# Patient Record
Sex: FEMALE | Race: OTHER | Employment: UNEMPLOYED | URBAN - METROPOLITAN AREA
[De-identification: names, ages, dates, MRNs, and addresses within clinical notes are randomized per-mention and may not be internally consistent; named-entity substitution may affect disease eponyms.]

---

## 2017-01-16 ENCOUNTER — HOSPITAL ENCOUNTER (OUTPATIENT)
Dept: PHYSICAL THERAPY | Age: 32
Discharge: HOME | End: 2017-01-16
Payer: COMMERCIAL

## 2017-01-16 PROCEDURE — 97110 THERAPEUTIC EXERCISES: CPT

## 2017-01-16 PROCEDURE — 97161 PT EVAL LOW COMPLEX 20 MIN: CPT
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
## 2017-01-27 ENCOUNTER — HOSPITAL ENCOUNTER (OUTPATIENT)
Dept: PHYSICAL THERAPY | Age: 32
Discharge: HOME | End: 2017-01-27
Payer: COMMERCIAL

## 2017-01-27 PROCEDURE — 97110 THERAPEUTIC EXERCISES: CPT

## 2017-01-27 PROCEDURE — 97014 ELECTRIC STIMULATION THERAPY: CPT

## 2017-01-27 NOTE — PROGRESS NOTES
PT DAILY TREATMENT NOTE 8-14    Patient Name: Nanette Quijano  Date:2017  : 1985  [x]  Patient  Verified  Payor: SELF PAY / Plan: [facility] SELF PAY / Product Type: Self Pay /    In time:730  Out time:828  Total Treatment Time (min): 58  Visit #: 2 of 12    Treatment Area: Lower back pain [M54.5]    SUBJECTIVE  Pain Level (0-10 scale): 6  Any medication changes, allergies to medications, adverse drug reactions, diagnosis change, or new procedure performed?: [x] No    [] Yes (see summary sheet for update)  Subjective functional status/changes:   [] No changes reported  \"I tried the exercises 1 time a day and they seemed to help I think\"    OBJECTIVE  Modality rationale: decrease pain and increase tissue extensibility to improve the patients ability to perform functional ADL's   Min Type Additional Details   15 [x] Estim: []Att   [x]Unatt        []TENS instruct                  [x]IFC  []Premod   []NMES                     []Other:  []w/US   []w/ice   [x]w/heat  Position:supine  Location: L/S    []  Traction: [] Cervical       []Lumbar                       [] Prone          []Supine                       []Intermittent   []Continuous Lbs:  [] before manual  [] after manual    []  Ultrasound: []Continuous   [] Pulsed                           []1MHz   []3MHz Location:  W/cm2:    []  Iontophoresis with dexamethasone         Location: [] Take home patch   [] In clinic    []  Ice     []  heat  []  Ice massage Position:  Location:    []  Vasopneumatic Device Pressure:       [] lo [] med [] hi   Temperature: [] lo [] med [] hi   [x] Skin assessment post-treatment:  [x]intact []redness- no adverse reaction       []redness  adverse reaction:       43 min Therapeutic Exercise:  [x] See flow sheet :   Rationale: increase ROM and increase strength to improve the patients ability to perform functional ADL's            X min Patient Education: [x] Review HEP    [] Progressed/Changed HEP based on:   [] positioning   [] body mechanics   [] transfers   [] heat/ice application        Other Objective/Functional Measures: Initiated therex today per flow sheet, requiring vc and demo 100% of the time for proper form and technique. Pain Level (0-10 scale) post treatment: 1    ASSESSMENT/Changes in Function: Pt required VC and demo for all therex today for proper form and technique. Pt demo'd increased tightness on the R QL and R piriformis greater then the L. Will continue to progress therex within current POC as patient is able. Patient will continue to benefit from skilled PT services to modify and progress therapeutic interventions, address functional mobility deficits, address ROM deficits, address strength deficits, analyze and address soft tissue restrictions, analyze and cue movement patterns, analyze and modify body mechanics/ergonomics and assess and modify postural abnormalities to attain remaining goals. [x]  See Plan of Care  []  See progress note/recertification  []  See Discharge Summary         Progress towards goals / Updated goals:  All goals reviewed and progressing appropriately as of 1/27/2017     PLAN  [x]  Upgrade activities as tolerated     [x]  Continue plan of care  []  Update interventions per flow sheet       []  Discharge due to:_  []  Other:_      Kita Rivera, PT 1/27/2017  7:21 AM    Future Appointments  Date Time Provider Blayne Cardozo   1/27/2017 7:30 AM Jeronimo Yun PT [facility]   1/30/2017 5:00 PM Jeronimo Yun PT [facility]   2/3/2017 7:30 AM Jeronimo Yun PT [facility]   2/6/2017 7:30 AM Eleonora Benites PTA [facility]   2/10/2017 7:30 AM Jeronimo Yun PT [facility]

## 2017-01-30 ENCOUNTER — HOSPITAL ENCOUNTER (OUTPATIENT)
Dept: PHYSICAL THERAPY | Age: 32
Discharge: HOME | End: 2017-01-30
Payer: COMMERCIAL

## 2017-01-30 PROCEDURE — 97110 THERAPEUTIC EXERCISES: CPT

## 2017-01-30 PROCEDURE — 97014 ELECTRIC STIMULATION THERAPY: CPT

## 2017-01-30 NOTE — PROGRESS NOTES
PT DAILY TREATMENT NOTE 8-14    Patient Name: Irma Height  Date:2017  : 1985  [x]  Patient  Verified  Payor: SELF PAY / Plan: 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 in the back\"    OBJECTIVE  Modality rationale: decrease pain and increase tissue extensibility to improve the patients ability to perform functional ADL's   Min Type Additional Details   15 [x] Estim: []Att   [x]Unatt        []TENS instruct                  [x]IFC  []Premod   []NMES                     []Other:  []w/US   []w/ice   [x]w/heat  Position:supine  Location: L/S    []  Traction: [] Cervical       []Lumbar                       [] Prone          []Supine                       []Intermittent   []Continuous Lbs:  [] before manual  [] after manual    []  Ultrasound: []Continuous   [] Pulsed                           []1MHz   []3MHz Location:  W/cm2:    []  Iontophoresis with dexamethasone         Location: [] Take home patch   [] In clinic    []  Ice     []  heat  []  Ice massage Position:  Location:    []  Vasopneumatic Device Pressure:       [] lo [] med [] hi   Temperature: [] lo [] med [] hi   [x] Skin assessment post-treatment:  [x]intact []redness- no adverse reaction       []redness  adverse reaction:       48 min Therapeutic Exercise:  [x] See flow sheet :   Rationale: increase ROM and increase strength to improve the patients ability to perform functional ADL's            X min Patient Education: [x] Review HEP    [] Progressed/Changed HEP based on:   [] positioning   [] body mechanics   [] transfers   [] heat/ice application        Other Objective/Functional Measures:       Pain Level (0-10 scale) post treatment: 2    ASSESSMENT/Changes in Function: Pt demonstrated increase ability to perform bird dogs with UE today with no reports of knee pain. Pt able to maintain bridge on the SB with better tolerance today then last visit. Will continue to progress therex within current POC as patient is able. Patient will continue to benefit from skilled PT services to modify and progress therapeutic interventions, address functional mobility deficits, address ROM deficits, address strength deficits, analyze and address soft tissue restrictions, analyze and cue movement patterns, analyze and modify body mechanics/ergonomics and assess and modify postural abnormalities to attain remaining goals. [x]  See Plan of Care  []  See progress note/recertification  []  See Discharge Summary         Progress towards goals / Updated goals:  All goals reviewed and progressing appropriately as of 1/30/2017     PLAN  [x]  Upgrade activities as tolerated     [x]  Continue plan of care  []  Update interventions per flow sheet       []  Discharge due to:_  []  Other:_      Brendan Ashby, PT 1/30/2017  7:21 AM    Future Appointments  Date Time Provider Blayne Cardozo   1/30/2017 5:00 PM Samia Yun PT [facility]   2/3/2017 7:30 AM Samia Yun PT [facility]   2/6/2017 7:30 AM Michael Myers PTA [facility]   2/10/2017 7:30 AM Samia Yun PT [facility]

## 2017-02-03 ENCOUNTER — HOSPITAL ENCOUNTER (OUTPATIENT)
Dept: PHYSICAL THERAPY | Age: 32
End: 2017-02-03
Payer: SELF-PAY

## 2017-02-06 ENCOUNTER — HOSPITAL ENCOUNTER (OUTPATIENT)
Dept: PHYSICAL THERAPY | Age: 32
Discharge: HOME | End: 2017-02-06
Payer: SELF-PAY

## 2017-02-06 PROCEDURE — 97014 ELECTRIC STIMULATION THERAPY: CPT

## 2017-02-06 PROCEDURE — 97110 THERAPEUTIC EXERCISES: CPT

## 2017-02-06 NOTE — PROGRESS NOTES
PT DAILY TREATMENT NOTE 8    Patient Name: Ifrah Phillips  Date:2017  : 1985  [x]  Patient  Verified  Payor: SELF PAY / Plan: [facility] SELF PAY / Product Type: Self Pay /    In time:7:30  Out time:8:50  Total Treatment Time (min): 80  Total Timed Codes (min): 74  1:1 Treatment Time (min):   Visit #: 4 of 12    Treatment Area: Lower back pain [M54.5]    SUBJECTIVE  Pain Level (0-10 scale): 4/10  Any medication changes, allergies to medications, adverse drug reactions, diagnosis change, or new procedure performed?: [x] No    [] Yes (see summary sheet for update)  Subjective functional status/changes:   [] No changes reported  I feel most of the pain in the middle of my lower back and my neck lately, but I have been sleeping better and I don't have the numbness in my legs anymore.     OBJECTIVE  Modality rationale: decrease pain and increase tissue extensibility to improve the patients ability to improve functional abilities   Min Type Additional Details   15 [x] Estim: []Att   [x]Unatt        []TENS instruct                  [x]IFC  []Premod   []NMES                     []Other:  []w/US   []w/ice   [x]w/heat  Position:supine  Location:lumbar    []  Traction: [] Cervical       []Lumbar                       [] Prone          []Supine                       []Intermittent   []Continuous Lbs:  [] before manual  [] after manual    []  Ultrasound: []Continuous   [] Pulsed                           []1MHz   []3MHz Location:  W/cm2:    []  Iontophoresis with dexamethasone         Location: [] Take home patch   [] In clinic    []  Ice     []  heat  []  Ice massage Position:  Location:    []  Vasopneumatic Device Pressure:       [] lo [] med [] hi   Temperature: [] lo [] med [] hi   [] Skin assessment post-treatment:  []intact []redness- no adverse reaction       []redness  adverse reaction:       65 min Therapeutic Exercise:  [x] See flow sheet :   Rationale: increase ROM and increase strength to improve the patients ability to improve functional abilities         min Patient Education: [x] Review HEP    [] Progressed/Changed HEP based on:   [] positioning   [] body mechanics   [] transfers   [] heat/ice application        Other Objective/Functional Measures:     Pain Level (0-10 scale) post treatment: 0    ASSESSMENT/Changes in Function: Pt presents with chief c/o centralized LBP and cervical soreness since last tx, but most significant improvement with improved sleeping tolerance and decreased LE numbness/paresthesia. Pt was also able to advance to addition of stability ball stabs and marches as well as advancing to level 2 dead bug stabs with min to mod challenge as well today. Will continue to progress/advance patient within current POC as tolerated with monitoring symptoms. Patient will continue to benefit from skilled PT services to modify and progress therapeutic interventions, address functional mobility deficits, address ROM deficits, address strength deficits, analyze and cue movement patterns, analyze and modify body mechanics/ergonomics and assess and modify postural abnormalities to attain remaining goals.      []  See Plan of Care  []  See progress note/recertification  []  See Discharge Summary         Progress towards goals / Updated goals:      PLAN  [x]  Upgrade activities as tolerated     []  Continue plan of care  []  Update interventions per flow sheet       []  Discharge due to:_  []  Other:_      Miya Leach, PTA 2/6/2017  7:13 AM

## 2017-02-10 ENCOUNTER — HOSPITAL ENCOUNTER (OUTPATIENT)
Dept: PHYSICAL THERAPY | Age: 32
Discharge: HOME | End: 2017-02-10
Payer: SELF-PAY

## 2017-02-10 ENCOUNTER — APPOINTMENT (OUTPATIENT)
Dept: PHYSICAL THERAPY | Age: 32
End: 2017-02-10
Payer: SELF-PAY

## 2017-02-10 PROCEDURE — 97110 THERAPEUTIC EXERCISES: CPT

## 2017-02-10 PROCEDURE — 97014 ELECTRIC STIMULATION THERAPY: CPT

## 2017-02-10 NOTE — PROGRESS NOTES
PT DAILY TREATMENT NOTE 8-    Patient Name: Timoteo Chacko  Date:2/10/2017  : 1985  [x]  Patient  Verified  Payor: SELF PAY / Plan: [facility] SELF PAY / Product Type: Self Pay /    In time:758  Out time:912  Total Treatment Time (min): 74  Visit #: 5 of 12    Treatment Area: Lower back pain [M54.5]    SUBJECTIVE  Pain Level (0-10 scale): 3  Any medication changes, allergies to medications, adverse drug reactions, diagnosis change, or new procedure performed?: [x] No    [] Yes (see summary sheet for update)  Subjective functional status/changes:   [] No changes reported  Pt presents with increased stiffness in the neck today but the back is feeling mildly better.      OBJECTIVE  Modality rationale: decrease pain and increase tissue extensibility to improve the patients ability to improve functional abilities   Min Type Additional Details   15 [x] Estim: []Att   [x]Unatt        []TENS instruct                  [x]IFC  []Premod   []NMES                     []Other:  []w/US   []w/ice   [x]w/heat  Position:supine  Location:lumbar    []  Traction: [] Cervical       []Lumbar                       [] Prone          []Supine                       []Intermittent   []Continuous Lbs:  [] before manual  [] after manual    []  Ultrasound: []Continuous   [] Pulsed                           []1MHz   []3MHz Location:  W/cm2:    []  Iontophoresis with dexamethasone         Location: [] Take home patch   [] In clinic    []  Ice     []  heat  []  Ice massage Position:  Location:    []  Vasopneumatic Device Pressure:       [] lo [] med [] hi   Temperature: [] lo [] med [] hi   [] Skin assessment post-treatment:  []intact []redness- no adverse reaction       []redness  adverse reaction:       59 min Therapeutic Exercise:  [x] See flow sheet :   Rationale: increase ROM and increase strength to improve the patients ability to improve functional abilities         min Patient Education: [x] Review HEP    [] Progressed/Changed HEP based on:   [] positioning   [] body mechanics   [] transfers   [] heat/ice application        Other Objective/Functional Measures:     Pain Level (0-10 scale) post treatment: 0    ASSESSMENT/Changes in Function: Pt was able to progress to LE's today with bird dogs with mild decrease in flat back with leg lifts. Pt continues to have decreasing pain levels overall with improve function reported at home. Patient will continue to benefit from skilled PT services to modify and progress therapeutic interventions, address functional mobility deficits, address ROM deficits, address strength deficits, analyze and cue movement patterns, analyze and modify body mechanics/ergonomics and assess and modify postural abnormalities to attain remaining goals.      []  See Plan of Care  []  See progress note/recertification  []  See Discharge Summary         Progress towards goals / Updated goals:      PLAN  [x]  Upgrade activities as tolerated     []  Continue plan of care  []  Update interventions per flow sheet       []  Discharge due to:_  []  Other:_      Elaine Yun PT 2/10/2017  7:13 AM

## 2017-02-14 ENCOUNTER — HOSPITAL ENCOUNTER (OUTPATIENT)
Dept: PHYSICAL THERAPY | Age: 32
Discharge: HOME | End: 2017-02-14
Payer: SELF-PAY

## 2017-02-14 ENCOUNTER — HOSPITAL ENCOUNTER (OUTPATIENT)
Dept: PHYSICAL THERAPY | Age: 32
End: 2017-02-14
Payer: SELF-PAY

## 2017-02-14 PROCEDURE — 97014 ELECTRIC STIMULATION THERAPY: CPT

## 2017-02-14 PROCEDURE — 97110 THERAPEUTIC EXERCISES: CPT

## 2017-02-14 NOTE — PROGRESS NOTES
PT DAILY TREATMENT NOTE     Patient Name: Pastora Hampton  Date:2017  : 1985  [x]  Patient  Verified  Payor: SELF PAY / Plan: [facility] SELF PAY / Product Type: Self Pay /    In time:815  Out time:856  Total Treatment Time (min): 41  Visit #: 6 of 12    Treatment Area: Lower back pain [M54.5]    SUBJECTIVE  Pain Level (0-10 scale): 6  Any medication changes, allergies to medications, adverse drug reactions, diagnosis change, or new procedure performed?: [x] No    [] Yes (see summary sheet for update)  Subjective functional status/changes:   [] No changes reported  Pt reports increase pain today due to having to rock her sick child for hours last night.      OBJECTIVE  Modality rationale: decrease pain and increase tissue extensibility to improve the patients ability to improve functional abilities   Min Type Additional Details   15 [x] Estim: []Att   [x]Unatt        []TENS instruct                  [x]IFC  []Premod   []NMES                     []Other:  []w/US   []w/ice   [x]w/heat  Position:supine  Location:lumbar    []  Traction: [] Cervical       []Lumbar                       [] Prone          []Supine                       []Intermittent   []Continuous Lbs:  [] before manual  [] after manual    []  Ultrasound: []Continuous   [] Pulsed                           []1MHz   []3MHz Location:  W/cm2:    []  Iontophoresis with dexamethasone         Location: [] Take home patch   [] In clinic    []  Ice     []  heat  []  Ice massage Position:  Location:    []  Vasopneumatic Device Pressure:       [] lo [] med [] hi   Temperature: [] lo [] med [] hi   [] Skin assessment post-treatment:  []intact []redness- no adverse reaction       []redness  adverse reaction:       26 min Therapeutic Exercise:  [x] See flow sheet :   Rationale: increase ROM and increase strength to improve the patients ability to improve functional abilities         min Patient Education: [x] Review HEP    [] Progressed/Changed HEP based on:   [] positioning   [] body mechanics   [] transfers   [] heat/ice application        Other Objective/Functional Measures:     Pain Level (0-10 scale) post treatment: 2    ASSESSMENT/Changes in Function: Pt presents with increase pain today due to being up with her sick child all night and having to hold and rock him. Pt requested condensed appt due to having to return home quickly to check on her child. Modified therex today- see flow sheet. Patient will continue to benefit from skilled PT services to modify and progress therapeutic interventions, address functional mobility deficits, address ROM deficits, address strength deficits, analyze and cue movement patterns, analyze and modify body mechanics/ergonomics and assess and modify postural abnormalities to attain remaining goals.      []  See Plan of Care  []  See progress note/recertification  []  See Discharge Summary         Progress towards goals / Updated goals:      PLAN  [x]  Upgrade activities as tolerated     []  Continue plan of care  []  Update interventions per flow sheet       []  Discharge due to:_  []  Other:_      Sidney Yun, PT 2/14/2017  7:13 AM

## 2017-02-15 ENCOUNTER — APPOINTMENT (OUTPATIENT)
Dept: PHYSICAL THERAPY | Age: 32
End: 2017-02-15
Payer: SELF-PAY

## 2017-02-17 ENCOUNTER — HOSPITAL ENCOUNTER (OUTPATIENT)
Dept: PHYSICAL THERAPY | Age: 32
Discharge: HOME | End: 2017-02-17
Payer: SELF-PAY

## 2017-02-17 PROCEDURE — 97530 THERAPEUTIC ACTIVITIES: CPT

## 2017-02-17 PROCEDURE — 97110 THERAPEUTIC EXERCISES: CPT

## 2017-02-17 PROCEDURE — 97014 ELECTRIC STIMULATION THERAPY: CPT

## 2017-02-17 NOTE — PROGRESS NOTES
PT DAILY TREATMENT NOTE 8-14    Patient Name: Mirian Upton  Date:2017  : 1985  [x]  Patient  Verified  Payor: SELF PAY / Plan: [facility] SELF PAY / Product Type: Self Pay /    In time:9:00  Out time:10:20  Total Treatment Time (min): 80  Total Timed Codes (min): 65  1:1 Treatment Time (min):    Visit #: 7 of 12    Treatment Area: Lower back pain [M54.5]    SUBJECTIVE  Pain Level (0-10 scale): 5  Any medication changes, allergies to medications, adverse drug reactions, diagnosis change, or new procedure performed?: [x] No    [] Yes (see summary sheet for update)  Subjective functional status/changes:   [] No changes reported  See Progress Note    OBJECTIVE  Modality rationale: decrease pain and increase tissue extensibility to improve the patients ability to change and maintain body/trunk positions     Min Type Additional Details   15 [x] Estim: []Att   [x]Unatt        []TENS instruct                  [x]IFC  []Premod   []NMES                     []Other:  []w/US   []w/ice   [x]w/heat  Position:  Location: Lumbar    []  Traction: [] Cervical       []Lumbar                       [] Prone          []Supine                       []Intermittent   []Continuous Lbs:  [] before manual  [] after manual    []  Ultrasound: []Continuous   [] Pulsed                           []1MHz   []3MHz Location:  W/cm2:    []  Iontophoresis with dexamethasone         Location: [] Take home patch   [] In clinic    []  Ice     []  heat  []  Ice massage Position:  Location:    []  Vasopneumatic Device Pressure:       [] lo [] med [] hi   Temperature: [] lo [] med [] hi   [] Skin assessment post-treatment:  []intact []redness- no adverse reaction       []redness  adverse reaction:       57 min Therapeutic Exercise:  [] See flow sheet :   Rationale: increase ROM and increase strength to improve the patients ability to change and maintain body/trunk positions    Therapeutic activities: Patient education 1:1 for lifting/body mechanics training to ensure proper movement patterns with lifting from floor to minimize lumbar strain- 8 min  Pain Level (0-10 scale) post treatment: 0    ASSESSMENT/Changes in Function:  Patient will continue to benefit from skilled PT services to modify and progress therapeutic interventions, address strength deficits and analyze and cue movement patterns to attain remaining goals.      []  See Plan of Care  [x]  See progress note/recertification  []  See Discharge Summary         PLAN  []  Upgrade activities as tolerated     [x]  Continue plan of care  []  Update interventions per flow sheet       []  Discharge due to:_  []  Other:_      Ana Stapleton, PT 2/17/2017  7:56 AM

## 2017-02-17 NOTE — PROGRESS NOTES
[facility] AT [address], [address]  Phone: [phone]   [phone]  PROGRESS NOTE  Patient Name: Elton Aquino : 1985   Treatment/Medical Diagnosis: Lower back pain [M54.5]   Referral Source: Uriah Stern MD     Date of Initial Visit: 17 Attended Visits: 7 Missed Visits: 12     SUMMARY OF TREATMENT  Therapeutic exercise for flexibility and core stabilization, pain modalities, patient education, HEP  CURRENT STATUS  Patient reports 40% progress following completion of 7 of 12 prescribed therapy visits. She reports nearly full resolution of reported right leg tingling since starting therapy, and reports primary deficit of pain/limitation with repetitive flexion/bending activities. Pain intensity ranges from 3-6/10. Pt would benefit from additional therapy to progress core strength and muscular endurance to further improve bending, lifting, cleaning ADL's. Progress towards established therapy goals is as follows: · Short Term Goals: To be accomplished in 4-6 treatments: Independent/compliant with long term HEP for core stability, hip and spine mobility- Met and ongoing  Patient will independently demonstrate proper lifting mechanics to promote lumbar safety and reduced injury risk- Partially met, but requires reinforcement and cueing  · Long Term Goals:  To be accomplished in 8-12 treatments:  Improve FOTO outcome score by 20-25% to indicate a significant improvement in ADL function- Met, improved from 35/100 to 57/100  Pt will report 75% reduction of frequency/intensity of right leg radicular symptoms with ADL's- Met  Pt will report 50% functional improvement with bending/lifting ADL's- Not met  RECOMMENDATIONS  Recommend patient continue therapy 5 times (for 12 total visits), maintaining 2x/week frequency to progress core strength and reinforcement of body mechanics, ergonomics, etc.    If you have any questions/comments please contact us directly at [phone]. Thank you for allowing us to assist in the care of your patient. Therapist Signature: Raj Powers PT, MANOJ Date: 2/17/2017     Time: 7:51 AM   NOTE TO PHYSICIAN:  PLEASE COMPLETE THE ORDERS BELOW AND FAX TO   [facility] at [facility] UNIT: [phone]. If you are unable to process this request in 24 hours please contact our office: [phone].    ___ I have read the above report and request that my patient continue as recommended.   ___ I have read the above report and request that my patient continue therapy with the following changes/special instructions:_________________________________________________________   ___ I have read the above report and request that my patient be discharged from therapy.      Physician Signature:        Date:       Time:

## 2017-02-22 ENCOUNTER — HOSPITAL ENCOUNTER (OUTPATIENT)
Dept: PHYSICAL THERAPY | Age: 32
Discharge: HOME | End: 2017-02-22
Payer: SELF-PAY

## 2017-02-22 PROCEDURE — 97110 THERAPEUTIC EXERCISES: CPT

## 2017-02-22 PROCEDURE — 97014 ELECTRIC STIMULATION THERAPY: CPT

## 2017-02-22 NOTE — PROGRESS NOTES
PT DAILY TREATMENT NOTE     Patient Name: Shahid Patient  Date:2017  : 1985  [x]  Patient  Verified  Payor: SELF PAY / [facility] / Product Type: Self Pay /    In time:8:07  Out time:9:12  Total Treatment Time (min): 65  Total Timed Codes (min): 65  1:1 Treatment Time (min):    Visit #: 8 of 12    Treatment Area: Lower back pain [M54.5]    SUBJECTIVE  Pain Level (0-10 scale): 4  Any medication changes, allergies to medications, adverse drug reactions, diagnosis change, or new procedure performed?: [x] No    [] Yes (see summary sheet for update)  Subjective functional status/changes:   [] No changes reported  Yesterday was not quite as demanding a day physically so I'm less sore today.     OBJECTIVE  Modality rationale: decrease pain and increase tissue extensibility to improve the patients ability to change and maintain body/trunk positions     Min Type Additional Details   12 [x] Estim: []Att   [x]Unatt        []TENS instruct                  [x]IFC  []Premod   []NMES                     []Other:  []w/US   []w/ice   [x]w/heat  Position:  Location: Lumbar    []  Traction: [] Cervical       []Lumbar                       [] Prone          []Supine                       []Intermittent   []Continuous Lbs:  [] before manual  [] after manual    []  Ultrasound: []Continuous   [] Pulsed                           []1MHz   []3MHz Location:  W/cm2:    []  Iontophoresis with dexamethasone         Location: [] Take home patch   [] In clinic    []  Ice     []  heat  []  Ice massage Position:  Location:    []  Vasopneumatic Device Pressure:       [] lo [] med [] hi   Temperature: [] lo [] med [] hi   [] Skin assessment post-treatment:  []intact []redness- no adverse reaction       []redness  adverse reaction:       53 min Therapeutic Exercise:  [] See flow sheet :   Rationale: increase ROM and increase strength to improve the patients ability to change and maintain body/trunk positions    Other Objective/Functional Measures:     Pain Level (0-10 scale) post treatment: 0    ASSESSMENT/Changes in Function: Pt was able to correctly recall and perform crate lifting from floor today with good lumbar and hip mechanics. Further reinforcement should help with carryover of improvement to functional lifting ADL's    Patient will continue to benefit from skilled PT services to modify and progress therapeutic interventions, address strength deficits and analyze and cue movement patterns to attain remaining goals.      []  See Plan of Care  []  See progress note/recertification  []  See Discharge Summary           PLAN  [x]  Upgrade activities as tolerated     []  Continue plan of care  []  Update interventions per flow sheet       []  Discharge due to:_  []  Other:_      Eveline Dumont, PT 2/22/2017  8:39 AM

## 2017-02-24 ENCOUNTER — HOSPITAL ENCOUNTER (OUTPATIENT)
Dept: PHYSICAL THERAPY | Age: 32
Discharge: HOME | End: 2017-02-24
Payer: SELF-PAY

## 2017-02-24 PROCEDURE — 97110 THERAPEUTIC EXERCISES: CPT

## 2017-02-24 NOTE — PROGRESS NOTES
PT DAILY TREATMENT NOTE     Patient Name: Raulito Harris  Date:2017  : 1985  [x]  Patient  Verified  Payor: SELF PAY / Plan: [facility] SELF PAY / Product Type: Self Pay /    In time:8:18  Out time:9:00  Total Treatment Time (min): 42  Total Timed Codes (min): 36  1:1 Treatment Time (min):    Visit #: 9 of 12    Treatment Area: Lower back pain [M54.5]    SUBJECTIVE  Pain Level (0-10 scale): 3-10  Any medication changes, allergies to medications, adverse drug reactions, diagnosis change, or new procedure performed?: [x] No    [] Yes (see summary sheet for update)  Subjective functional status/changes:   [] No changes reported  My lower back is hurting a little bit more than usual from doing a lot of walking and standing at home. OBJECTIVE      42 min Therapeutic Exercise:  [x] See flow sheet :   Rationale: increase ROM and increase strength to improve the patients ability to improve functional abilities       min Patient Education: [x] Review HEP    [] Progressed/Changed HEP based on:   [] positioning   [] body mechanics   [] transfers   [] heat/ice application        Other Objective/Functional Measures:     Pain Level (0-10 scale) post treatment: 1/10    ASSESSMENT/Changes in Function: Pt presented with chief c/o increased lumbar pain/sxs from prolonged standing/ambulation before tx today, but was able to tolerate full therex regiment with min to mod challenge today. Will continue to progress/advance patient within current POC as tolerated with monitoring symptoms. Patient will continue to benefit from skilled PT services to modify and progress therapeutic interventions, address functional mobility deficits, address ROM deficits, address strength deficits, analyze and cue movement patterns, analyze and modify body mechanics/ergonomics and assess and modify postural abnormalities to attain remaining goals.      []  See Plan of Care  []  See progress note/recertification  []  See Discharge Summary         Progress towards goals / Updated goals:      PLAN  [x]  Upgrade activities as tolerated     []  Continue plan of care  []  Update interventions per flow sheet       []  Discharge due to:_  []  Other:_      José Miguel Arellano PTA 2017  8:33 AM

## 2017-02-27 ENCOUNTER — APPOINTMENT (OUTPATIENT)
Dept: PHYSICAL THERAPY | Age: 32
End: 2017-02-27
Payer: SELF-PAY

## 2017-03-03 ENCOUNTER — HOSPITAL ENCOUNTER (OUTPATIENT)
Dept: PHYSICAL THERAPY | Age: 32
Discharge: HOME | End: 2017-03-03
Payer: COMMERCIAL

## 2017-03-03 PROCEDURE — 97014 ELECTRIC STIMULATION THERAPY: CPT

## 2017-03-03 PROCEDURE — 97110 THERAPEUTIC EXERCISES: CPT

## 2017-03-03 NOTE — PROGRESS NOTES
PT DAILY TREATMENT NOTE 8    Patient Name: Princess Mann  Date:3/3/2017  : 1985  [x]  Patient  Verified  Payor: SELF PAY / [facility] / Product Type: Self Pay /    In time:8:12  Out time:9:11  Total Treatment Time (min): 59  Total Timed Codes (min): 59  1:1 Treatment Time (min):    Visit #: 10 of 12    Treatment Area: Lower back pain [M54.5]    SUBJECTIVE  Pain Level (0-10 scale): 6  Any medication changes, allergies to medications, adverse drug reactions, diagnosis change, or new procedure performed?: [x] No    [] Yes (see summary sheet for update)  Subjective functional status/changes:   [] No changes reported  I feel like I've been favoring my hip the last few days when standing and walking.     OBJECTIVE  Modality rationale: decrease pain and increase tissue extensibility to improve the patients ability to change and maintain body/trunk positions     Min Type Additional Details   12 [x] Estim: []Att   [x]Unatt        []TENS instruct                  [x]IFC  []Premod   []NMES                     []Other:  []w/US   []w/ice   [x]w/heat  Position:  Location: Lumbar    []  Traction: [] Cervical       []Lumbar                       [] Prone          []Supine                       []Intermittent   []Continuous Lbs:  [] before manual  [] after manual    []  Ultrasound: []Continuous   [] Pulsed                           []1MHz   []3MHz Location:  W/cm2:    []  Iontophoresis with dexamethasone         Location: [] Take home patch   [] In clinic    []  Ice     []  heat  []  Ice massage Position:  Location:    []  Vasopneumatic Device Pressure:       [] lo [] med [] hi   Temperature: [] lo [] med [] hi   [] Skin assessment post-treatment:  []intact []redness- no adverse reaction       []redness  adverse reaction:       47 min Therapeutic Exercise:  [] See flow sheet :   Rationale: increase strength and flexibility to improve the patients ability to change and maintain body/trunk positions    Pain Level (0-10 scale) post treatment: 1    ASSESSMENT/Changes in Function: Pt presents with increased positive Catherine's test L>R. Added ITB wall stretch to both treatment programs and HEP. Also added single leg balance for proximal hip strength/stabilization. Patient will continue to benefit from skilled PT services to modify and progress therapeutic interventions, address strength deficits and analyze and cue movement patterns to attain remaining goals.      []  See Plan of Care  []  See progress note/recertification  []  See Discharge Summary         PLAN  []  Upgrade activities as tolerated     [x]  Continue plan of care  []  Update interventions per flow sheet       []  Discharge due to:_  []  Other:_      Harrison Emmanuel, PT 3/3/2017  7:55 AM

## 2017-03-07 ENCOUNTER — HOSPITAL ENCOUNTER (OUTPATIENT)
Dept: PHYSICAL THERAPY | Age: 32
Discharge: HOME | End: 2017-03-07
Payer: COMMERCIAL

## 2017-03-07 PROCEDURE — 97110 THERAPEUTIC EXERCISES: CPT

## 2017-03-07 PROCEDURE — 97014 ELECTRIC STIMULATION THERAPY: CPT

## 2017-03-07 NOTE — PROGRESS NOTES
PT DAILY TREATMENT NOTE 8-14    Patient Name: Josefa Ritter  Date:3/7/2017  : 1985  [x]  Patient  Verified  Payor: SELF PAY / Plan: [facility] SELF PAY / Product Type: Self Pay /    In time:813  Out time:910  Total Treatment Time (min): 57   Visit #: 11 of 12    Treatment Area: Lower back pain [M54.5]    SUBJECTIVE  Pain Level (0-10 scale): 2-3  Any medication changes, allergies to medications, adverse drug reactions, diagnosis change, or new procedure performed?: [x] No    [] Yes (see summary sheet for update)  Subjective functional status/changes:   [] No changes reported  \"I have been feeling really good, kind of sore but better then I have been\"    OBJECTIVE  Modality rationale: decrease pain and increase tissue extensibility to improve the patients ability to change and maintain body/trunk positions     Min Type Additional Details   10 [x] Estim: []Att   [x]Unatt        []TENS instruct                  [x]IFC  []Premod   []NMES                     []Other:  []w/US   []w/ice   [x]w/heat  Position:  Location: Lumbar    []  Traction: [] Cervical       []Lumbar                       [] Prone          []Supine                       []Intermittent   []Continuous Lbs:  [] before manual  [] after manual    []  Ultrasound: []Continuous   [] Pulsed                           []1MHz   []3MHz Location:  W/cm2:    []  Iontophoresis with dexamethasone         Location: [] Take home patch   [] In clinic    []  Ice     []  heat  []  Ice massage Position:  Location:    []  Vasopneumatic Device Pressure:       [] lo [] med [] hi   Temperature: [] lo [] med [] hi   [] Skin assessment post-treatment:  []intact []redness- no adverse reaction       []redness  adverse reaction:       47 min Therapeutic Exercise:  [] See flow sheet :   Rationale: increase strength and flexibility to improve the patients ability to change and maintain body/trunk positions    Pain Level (0-10 scale) post treatment: 1    ASSESSMENT/Changes in Function: Pt reports 60A% overall improvement since beginning therapy. Pt wishes to continue with therapy 4 more visits with 2x/week then taper to 1x/week for 2 weeks. Will continue to progress therex within current POC as patient is able. Patient will continue to benefit from skilled PT services to modify and progress therapeutic interventions, address strength deficits and analyze and cue movement patterns to attain remaining goals.      []  See Plan of Care  []  See progress note/recertification  []  See Discharge Summary         PLAN  []  Upgrade activities as tolerated     [x]  Continue plan of care  []  Update interventions per flow sheet       []  Discharge due to:_  []  Other:_      Suzanna Yun, PT 3/7/2017  7:55 AM

## 2017-03-10 ENCOUNTER — HOSPITAL ENCOUNTER (OUTPATIENT)
Dept: PHYSICAL THERAPY | Age: 32
Discharge: HOME | End: 2017-03-10
Payer: COMMERCIAL

## 2017-03-10 PROCEDURE — 97110 THERAPEUTIC EXERCISES: CPT

## 2017-03-10 NOTE — PROGRESS NOTES
[facility] AT [address], [address] [place] [address] Road  Phone: [phone]   [phone]  PROGRESS NOTE  Patient Name: Deon France : 1985   Treatment/Medical Diagnosis: Lower back pain [M54.5]   Referral Source: Ashwini Marcelino MD     Date of Initial Visit: 17 Attended Visits: 12 Missed Visits: 4     SUMMARY OF TREATMENT  Therapeutic exercise for flexibility and core stabilization, pain modalities, patient education, HEP  CURRENT STATUS  Patient reports approximately 70% overall improvement from therapy since initial evaluation with 2-3/10 pain level on average, increased to 6/10 at the worst with prolonged standing/walking. Pt has made good progress with advancing with lumbar mobility and level 2 lumbopelvic/core stabilization focused program over the past few visits. Pt would benefit from continued therapy for 4 additional visits with decreasing frequency of visits to achieve maximum medical benefit from current POC. Will continue to progress/advance patient within current POC as tolerated with monitoring symptoms. Goal/Measure of Progress Goal Met? 1. Patient will independently demonstrate proper lifting mechanics to promote lumbar safety and reduced injury risk-   Status at last Eval: Progressing Current Status: Met yes   2. Pt will report 50% functional improvement with bending/lifting ADL's   Status at last Eval: Progressing Current Status: 60% improvement reported yes     New Goals to be achieved in __4__  treatments:  1. Pt will be able to tolerate prolonged standing/walking activity for =/> 2-3 hours without increasing pain levels > 2-3/10 at the worst.   2.  Pt will be given and instructed on updated HEP for continued maintenance of decreased symptoms after discharge from therapy.     RECOMMENDATIONS  Continue with current POC 4 additional visits with advancing as tolerated with decreasing frequency of visits to 2x/week for 2 visits then 1x/week for the last 2 visits, then reassess for discharge from therapy as planned/discussed. If you have any questions/comments please contact us directly at [phone]. Thank you for allowing us to assist in the care of your patient. LPTA Signature: Ashleigh Choi PTA  Date: 3/10/2017   PT Signature: DEEPA Elliott, MANOJ   Time: 8:27 AM   NOTE TO PHYSICIAN:  PLEASE COMPLETE THE ORDERS BELOW AND FAX TO   [facility] UNIT: [phone]. If you are unable to process this request in 24 hours please contact our office: [phone].    ___ I have read the above report and request that my patient continue as recommended.   ___ I have read the above report and request that my patient continue therapy with the following changes/special instructions:_________________________________________________________   ___ I have read the above report and request that my patient be discharged from therapy.      Physician Signature:        Date:       Time:

## 2017-03-10 NOTE — PROGRESS NOTES
PT DAILY TREATMENT NOTE     Patient Name: Pastora Hampotn  Date:3/10/2017  : 1985  [x]  Patient  Verified  Payor: SELF PAY / Plan: [facility] SELF PAY / Product Type: Self Pay /    In time:8:14  Out time:9:25  Total Treatment Time (min): 71  Total Timed Codes (min): 55  1:1 Treatment Time (min):    Visit #: 12 of 12    Treatment Area: Lower back pain [M54.5]    SUBJECTIVE  Pain Level (0-10 scale): 3/10  Any medication changes, allergies to medications, adverse drug reactions, diagnosis change, or new procedure performed?: [x] No    [] Yes (see summary sheet for update)  Subjective functional status/changes:   [] No changes reported  My back feels like it has been steadily getting better, but I still get some shooting pain in the middle of my lower back if I am sitting certain ways.     OBJECTIVE  Modality rationale: decrease pain and increase tissue extensibility to improve the patients ability to improve functional abilities   Min Type Additional Details    [] Estim: []Att   []Unatt        []TENS instruct                  []IFC  []Premod   []NMES                     []Other:  []w/US   []w/ice   []w/heat  Position:  Location:    []  Traction: [] Cervical       []Lumbar                       [] Prone          []Supine                       []Intermittent   []Continuous Lbs:  [] before manual  [] after manual    []  Ultrasound: []Continuous   [] Pulsed                           []1MHz   []3MHz Location:  W/cm2:    []  Iontophoresis with dexamethasone         Location: [] Take home patch   [] In clinic   10 []  Ice     [x]  heat  []  Ice massage Position:supine  Location:supine    []  Vasopneumatic Device Pressure:       [] lo [] med [] hi   Temperature: [] lo [] med [] hi   [] Skin assessment post-treatment:  []intact []redness- no adverse reaction       []redness  adverse reaction:       61 min Therapeutic Exercise:  [x] See flow sheet :   Rationale: increase ROM and increase strength to improve the patients ability to improve functional abilities           min Patient Education: [x] Review HEP    [] Progressed/Changed HEP based on:   [] positioning   [] body mechanics   [] transfers   [] heat/ice application        Other Objective/Functional Measures:     Pain Level (0-10 scale) post treatment: 0    ASSESSMENT/Changes in Function:     Patient will continue to benefit from skilled PT services to modify and progress therapeutic interventions, address functional mobility deficits, address ROM deficits, address strength deficits, analyze and cue movement patterns, analyze and modify body mechanics/ergonomics and assess and modify postural abnormalities to attain remaining goals. []  See Plan of Care  [x]  See progress note/recertification  []  See Discharge Summary         Progress towards goals / Updated goals:  See Progress note/Physician update for full detailed progress towards established goals.     PLAN  [x]  Upgrade activities as tolerated     []  Continue plan of care  []  Update interventions per flow sheet       []  Discharge due to:_  []  Other:_      Sean Han, RYANNE 3/10/2017  8:01 AM

## 2017-03-14 ENCOUNTER — HOSPITAL ENCOUNTER (OUTPATIENT)
Dept: PHYSICAL THERAPY | Age: 32
End: 2017-03-14
Payer: COMMERCIAL

## 2017-03-17 ENCOUNTER — HOSPITAL ENCOUNTER (OUTPATIENT)
Dept: PHYSICAL THERAPY | Age: 32
Discharge: HOME | End: 2017-03-17
Payer: COMMERCIAL

## 2017-03-17 PROCEDURE — 97110 THERAPEUTIC EXERCISES: CPT

## 2017-03-17 PROCEDURE — 97014 ELECTRIC STIMULATION THERAPY: CPT

## 2017-03-17 NOTE — PROGRESS NOTES
PT DAILY TREATMENT NOTE 8    Patient Name: Christopher Taylor  Date:3/17/2017  : 1985  [x]  Patient  Verified  Payor: SELF PAY / [facility] / Product Type: Self Pay /    In time:8:03  Out time:9:03  Total Treatment Time (min): 60  Total Timed Codes (min): 60  1:1 Treatment Time (min):    Visit #: 13 of 16    Treatment Area: Lower back pain [M54.5]    SUBJECTIVE  Pain Level (0-10 scale): 3  Any medication changes, allergies to medications, adverse drug reactions, diagnosis change, or new procedure performed?: [x] No    [] Yes (see summary sheet for update)  Subjective functional status/changes:   [] No changes reported  Therapy is helping. I think my hip weakness is still present, but I feel like it's improving.      OBJECTIVE  Modality rationale: decrease pain and increase tissue extensibility to improve the patients ability to change and maintain body/trunk positions     Min Type Additional Details   12 [x] Estim: []Att   [x]Unatt        []TENS instruct                  [x]IFC  []Premod   []NMES                     []Other:  []w/US   []w/ice   []w/heat  Position:  Location: Lumbar    []  Traction: [] Cervical       []Lumbar                       [] Prone          []Supine                       []Intermittent   []Continuous Lbs:  [] before manual  [] after manual    []  Ultrasound: []Continuous   [] Pulsed                           []1MHz   []3MHz Location:  W/cm2:    []  Iontophoresis with dexamethasone         Location: [] Take home patch   [] In clinic    []  Ice     []  heat  []  Ice massage Position:  Location:    []  Vasopneumatic Device Pressure:       [] lo [] med [] hi   Temperature: [] lo [] med [] hi   [] Skin assessment post-treatment:  []intact []redness- no adverse reaction       []redness  adverse reaction:       48 min Therapeutic Exercise:  [] See flow sheet :   Rationale: increase ROM and increase strength to improve the patients ability to change and maintain body/trunk positions      Pain Level (0-10 scale) post treatment: 0    ASSESSMENT/Changes in Function: Pt demonstrates improved frontal plane hip and core stability with standing, resisted activities. Was advanced to single leg stance with band-resisted side taps today with increased R>L fatigue noted. Patient will continue to benefit from skilled PT services to modify and progress therapeutic interventions, address strength deficits, analyze and address soft tissue restrictions and analyze and cue movement patterns to attain remaining goals.      []  See Plan of Care  []  See progress note/recertification  []  See Discharge Summary         PLAN  [x]  Upgrade activities as tolerated     []  Continue plan of care  []  Update interventions per flow sheet       []  Discharge due to:_  []  Other:_      Scotty Reinoso PT 3/17/2017  7:46 AM

## 2017-03-30 ENCOUNTER — HOSPITAL ENCOUNTER (OUTPATIENT)
Dept: PHYSICAL THERAPY | Age: 32
Discharge: HOME | End: 2017-03-30
Payer: COMMERCIAL

## 2017-03-30 PROCEDURE — 97014 ELECTRIC STIMULATION THERAPY: CPT

## 2017-03-30 PROCEDURE — 97110 THERAPEUTIC EXERCISES: CPT

## 2017-03-30 NOTE — PROGRESS NOTES
PT DAILY TREATMENT NOTE 8    Patient Name: Juan Rodríguez  Date:3/30/2017  : 1985  [x]  Patient  Verified  Payor: Patti Chavarria / Plan: Jennifer Patton / Product Type: Commerical /    In time:7:10  Out time:8:35  Total Treatment Time (min): 85  Total Timed Codes (min): 79  1:1 Treatment Time (min):    Visit #: 14 of 16    Treatment Area: Lower back pain [M54.5]    SUBJECTIVE  Pain Level (0-10 scale): 5/10  Any medication changes, allergies to medications, adverse drug reactions, diagnosis change, or new procedure performed?: [x] No    [] Yes (see summary sheet for update)  Subjective functional status/changes:   [] No changes reported  My back is hurting a lot from driving up and back from [place] since I was here last and I haven't had a day off in a while.     OBJECTIVE  Modality rationale: decrease pain and increase tissue extensibility to improve the patients ability to improve functional abilities   Min Type Additional Details   15 [x] Estim: []Att   []Unatt        []TENS instruct                  []IFC  []Premod   []NMES                     []Other:  []w/US   []w/ice   [x]w/heat  Position:supine  Location:lumbar    []  Traction: [] Cervical       []Lumbar                       [] Prone          []Supine                       []Intermittent   []Continuous Lbs:  [] before manual  [] after manual    []  Ultrasound: []Continuous   [] Pulsed                           []1MHz   []3MHz Location:  W/cm2:    []  Iontophoresis with dexamethasone         Location: [] Take home patch   [] In clinic    []  Ice     []  heat  []  Ice massage Position:  Location:    []  Vasopneumatic Device Pressure:       [] lo [] med [] hi   Temperature: [] lo [] med [] hi   [] Skin assessment post-treatment:  []intact []redness- no adverse reaction       []redness  adverse reaction:       70 min Therapeutic Exercise:  [x] See flow sheet :   Rationale: increase ROM and increase strength to improve the patients ability to improve functional abilities           min Patient Education: [x] Review HEP    [] Progressed/Changed HEP based on:   [] positioning   [] body mechanics   [] transfers   [] heat/ice application        Other Objective/Functional Measures:     Pain Level (0-10 scale) post treatment: 0    ASSESSMENT/Changes in Function: Pt presents with chief c/o increased centralized lumbar pain from prolonged sitting since last tx, but was able to resume full therex regiment with min to mod challenge after appx 10 days since last tx. Will continue to progress/advance patient within current POC as tolerated with monitoring symptoms. Patient will continue to benefit from skilled PT services to modify and progress therapeutic interventions, address functional mobility deficits, address ROM deficits, address strength deficits, analyze and cue movement patterns, analyze and modify body mechanics/ergonomics and assess and modify postural abnormalities to attain remaining goals.      []  See Plan of Care  []  See progress note/recertification  []  See Discharge Summary         Progress towards goals / Updated goals:      PLAN  [x]  Upgrade activities as tolerated     []  Continue plan of care  []  Update interventions per flow sheet       []  Discharge due to:_  []  Other:_      Danni Corcoran, RYANNE 3/30/2017  7:10 AM

## 2017-04-04 ENCOUNTER — HOSPITAL ENCOUNTER (OUTPATIENT)
Dept: PHYSICAL THERAPY | Age: 32
End: 2017-04-04
Payer: COMMERCIAL

## 2017-04-13 ENCOUNTER — HOSPITAL ENCOUNTER (OUTPATIENT)
Dept: PHYSICAL THERAPY | Age: 32
Discharge: HOME | End: 2017-04-13
Payer: COMMERCIAL

## 2017-04-13 PROCEDURE — 97140 MANUAL THERAPY 1/> REGIONS: CPT

## 2017-04-13 PROCEDURE — 97110 THERAPEUTIC EXERCISES: CPT

## 2017-04-13 NOTE — PROGRESS NOTES
PT DAILY TREATMENT NOTE     Patient Name: Yaneli Reyez  Date:2017  : 1985  [x]  Patient  Verified  Payor: Faye Ayers / Plan: Brendon Guillen / Product Type: Commerical /    In time:4:28  Out time:5:30  Total Treatment Time (min): 62  Total Timed Codes (min): 62  1:1 Treatment Time (min):    Visit #: 15 of 16    Treatment Area: Lower back pain [M54.5]    SUBJECTIVE  Pain Level (0-10 scale): 4  Any medication changes, allergies to medications, adverse drug reactions, diagnosis change, or new procedure performed?: [x] No    [] Yes (see summary sheet for update)  Subjective functional status/changes:   [] No changes reported    See Progress Note    OBJECTIVE  Modality rationale: decrease pain and increase tissue extensibility to improve the patients ability to change and maintain body/trunk positions     Min Type Additional Details   12 [x] Estim: []Att   [x]Unatt        []TENS instruct                  [x]IFC  []Premod   []NMES                     []Other:  []w/US   []w/ice   [x]w/heat  Position:  Location: Lumbar    []  Traction: [] Cervical       []Lumbar                       [] Prone          []Supine                       []Intermittent   []Continuous Lbs:  [] before manual  [] after manual    []  Ultrasound: []Continuous   [] Pulsed                           []1MHz   []3MHz Location:  W/cm2:    []  Iontophoresis with dexamethasone         Location: [] Take home patch   [] In clinic    []  Ice     []  heat  []  Ice massage Position:  Location:    []  Vasopneumatic Device Pressure:       [] lo [] med [] hi   Temperature: [] lo [] med [] hi   [] Skin assessment post-treatment:  []intact []redness- no adverse reaction       []redness  adverse reaction:       50 min Therapeutic Exercise:  [] See flow sheet :   Rationale: increase ROM and increase strength to improve the patients ability to change and maintain body/trunk positions    Pain Level (0-10 scale) post treatment: 1    ASSESSMENT/Changes in Function:   Patient will continue to benefit from skilled PT services to modify and progress therapeutic interventions, address functional mobility deficits, analyze and address soft tissue restrictions and analyze and cue movement patterns to attain remaining goals.      []  See Plan of Care  [x]  See progress note/recertification  []  See Discharge Summary           PLAN  []  Upgrade activities as tolerated     [x]  Continue plan of care  []  Update interventions per flow sheet       []  Discharge due to:_  []  Other:_      Adolph Walker, PT 4/13/2017  4:36 PM

## 2017-04-13 NOTE — PROGRESS NOTES
[facility] AT [address], [address] [place] [address] Road  Phone: [phone]   [phone]  PROGRESS NOTE  Patient Name: Lamar Kennedy : 1985   Treatment/Medical Diagnosis: Lower back pain [M54.5]   Referral Source: Ronaldo Shaffer MD     Date of Initial Visit: 17 Attended Visits: 16 Missed Visits: 6     SUMMARY OF TREATMENT  Therapeutic exercise for lumbar/hip flexibility, core stabilization, pain modalities, patient education, HEP  CURRENT STATUS  Patient was seen for 14 visits for low back pain, but was out of the area for the past 2 weeks with extensive family visiting. During that time she reports some increase in lumbar stiffness (5-6/10) primarily due to long travel/car riding. Upon review today, pt exhibits 10-15% limitation with forward flexion AROM due to lumbar stiffness and is [facility] [place] with other motions. Today we extensively reviewed strategies for improving lumbar sitting posture/support. Pt is scheduled for 1 more visit next week, at which time we will likely recommend discharge with comprehensive review of home exercises, posture and body mechanics strategies. RECOMMENDATIONS  Pt will be seen 1 more visit, at which time we will most likely recommend discharge as detailed above. If you have any questions/comments please contact us directly at [phone]. Thank you for allowing us to assist in the care of your patient. Therapist Signature: Kathryn Romano PT, [facility] Date: 2017     Time: 5:38 PM   NOTE TO PHYSICIAN:  PLEASE COMPLETE THE ORDERS BELOW AND FAX TO   [facility] at [facility] UNIT: [phone].   If you are unable to process this request in 24 hours please contact our office: [phone].    ___ I have read the above report and request that my patient continue as recommended.   ___ I have read the above report and request that my patient continue therapy with the following changes/special instructions:_________________________________________________________   ___ I have read the above report and request that my patient be discharged from therapy.      Physician Signature:        Date:       Time:

## 2017-04-17 ENCOUNTER — HOSPITAL ENCOUNTER (OUTPATIENT)
Dept: PHYSICAL THERAPY | Age: 32
End: 2017-04-17
Payer: COMMERCIAL

## 2020-10-19 PROBLEM — Z34.90 TERM PREGNANCY: Status: ACTIVE | Noted: 2020-10-19
